# Patient Record
Sex: MALE | Race: WHITE
[De-identification: names, ages, dates, MRNs, and addresses within clinical notes are randomized per-mention and may not be internally consistent; named-entity substitution may affect disease eponyms.]

---

## 2022-10-27 ENCOUNTER — HOSPITAL ENCOUNTER (EMERGENCY)
Dept: HOSPITAL 26 - MED | Age: 42
Discharge: HOME | End: 2022-10-27
Payer: SELF-PAY

## 2022-10-27 VITALS — BODY MASS INDEX: 25.18 KG/M2 | HEIGHT: 69 IN | WEIGHT: 170 LBS

## 2022-10-27 VITALS — SYSTOLIC BLOOD PRESSURE: 131 MMHG | DIASTOLIC BLOOD PRESSURE: 95 MMHG

## 2022-10-27 VITALS — DIASTOLIC BLOOD PRESSURE: 90 MMHG | SYSTOLIC BLOOD PRESSURE: 109 MMHG

## 2022-10-27 DIAGNOSIS — Z79.899: ICD-10-CM

## 2022-10-27 DIAGNOSIS — K21.9: ICD-10-CM

## 2022-10-27 DIAGNOSIS — N20.0: ICD-10-CM

## 2022-10-27 DIAGNOSIS — K57.92: Primary | ICD-10-CM

## 2022-10-27 LAB
ALBUMIN FLD-MCNC: 4.1 G/DL (ref 3.4–5)
AMYLASE SERPL-CCNC: 66 U/L (ref 25–115)
ANION GAP SERPL CALCULATED.3IONS-SCNC: 14.1 MMOL/L (ref 8–16)
AST SERPL-CCNC: 19 U/L (ref 15–37)
BASOPHILS # BLD AUTO: 0 K/UL (ref 0–0.22)
BASOPHILS NFR BLD AUTO: 0.5 % (ref 0–2)
BILIRUB SERPL-MCNC: 0.5 MG/DL (ref 0–1)
BUN SERPL-MCNC: 12 MG/DL (ref 7–18)
CHLORIDE SERPL-SCNC: 103 MMOL/L (ref 98–107)
CO2 SERPL-SCNC: 28 MMOL/L (ref 21–32)
CREAT SERPL-MCNC: 0.8 MG/DL (ref 0.6–1.3)
EOSINOPHIL # BLD AUTO: 0 K/UL (ref 0–0.4)
EOSINOPHIL NFR BLD AUTO: 0.3 % (ref 0–4)
ERYTHROCYTE [DISTWIDTH] IN BLOOD BY AUTOMATED COUNT: 13.6 % (ref 11.6–13.7)
GFR SERPL CREATININE-BSD FRML MDRD: 137 ML/MIN (ref 90–?)
GLUCOSE SERPL-MCNC: 89 MG/DL (ref 74–106)
HCT VFR BLD AUTO: 42 % (ref 36–52)
HGB BLD-MCNC: 14.2 G/DL (ref 12–18)
LIPASE SERPL-CCNC: 140 U/L (ref 73–393)
LYMPHOCYTES # BLD AUTO: 1 K/UL (ref 2–11.5)
LYMPHOCYTES NFR BLD AUTO: 10.4 % (ref 20.5–51.1)
MCH RBC QN AUTO: 28 PG (ref 27–31)
MCHC RBC AUTO-ENTMCNC: 34 G/DL (ref 33–37)
MCV RBC AUTO: 83.6 FL (ref 80–94)
MONOCYTES # BLD AUTO: 0.7 K/UL (ref 0.8–1)
MONOCYTES NFR BLD AUTO: 7.1 % (ref 1.7–9.3)
NEUTROPHILS # BLD AUTO: 7.7 K/UL (ref 1.8–7.7)
NEUTROPHILS NFR BLD AUTO: 81.7 % (ref 42.2–75.2)
PLATELET # BLD AUTO: 212 K/UL (ref 140–450)
POTASSIUM SERPL-SCNC: 4.1 MMOL/L (ref 3.5–5.1)
RBC # BLD AUTO: 5.03 MIL/UL (ref 4.2–6.1)
SODIUM SERPL-SCNC: 141 MMOL/L (ref 136–145)
WBC # BLD AUTO: 9.4 K/UL (ref 4.8–10.8)

## 2022-10-27 PROCEDURE — 96372 THER/PROPH/DIAG INJ SC/IM: CPT

## 2022-10-27 PROCEDURE — 36415 COLL VENOUS BLD VENIPUNCTURE: CPT

## 2022-10-27 PROCEDURE — 85025 COMPLETE CBC W/AUTO DIFF WBC: CPT

## 2022-10-27 PROCEDURE — 83690 ASSAY OF LIPASE: CPT

## 2022-10-27 PROCEDURE — 82150 ASSAY OF AMYLASE: CPT

## 2022-10-27 PROCEDURE — 80053 COMPREHEN METABOLIC PANEL: CPT

## 2022-10-27 PROCEDURE — 99284 EMERGENCY DEPT VISIT MOD MDM: CPT

## 2022-10-27 PROCEDURE — 74176 CT ABD & PELVIS W/O CONTRAST: CPT

## 2022-10-27 NOTE — NUR
43YO MALE PT C/O INCREASED RL ABDOMINAL PAIN XTODAY. STATES INITIAL PAIN 
STARTED ON MONDAY AFTER EATING PASTA W/ MILD RELIEF AFTER TAKING OTC RX- UNABLE 
TO RECALL NAME. REPORTD VOMIT X3 THIS MORNING, DNEIES BLOOD. DENIES CHEST PAIN 
DIARRHEA, FEVER, CHILLS OR SOB. NOTES "BROWN " URINE, DENIES DYSURIA. ABDOMEN 
NON TENDER OR DISTENED, ACTIVE X4. PT AAOX4, RESPIRATIONS EVEN AND 
UNLABORED.HOB POSITIONED PER COMFORT.



HX:DIVERTICULITIS

NKA

## 2022-10-27 NOTE — NUR
Patient discharged with v/s stable. Written and verbal after care instructions 
FOR KIDNEY STONES AND DIVERTICULITIS given and explained. 

Patient alert, oriented and verbalized understanding of instructions. 
Ambulatory with steady gait. All questions addressed prior to discharge. ID 
band removed. Patient advised to follow up with PMD. Rx of METRONIDAZOLE, 
BACTRIM AND TRAMADOL given.  Opportunity to ask questions provided and 
answered.